# Patient Record
Sex: MALE | Race: WHITE | NOT HISPANIC OR LATINO | Employment: OTHER | URBAN - METROPOLITAN AREA
[De-identification: names, ages, dates, MRNs, and addresses within clinical notes are randomized per-mention and may not be internally consistent; named-entity substitution may affect disease eponyms.]

---

## 2017-03-21 ENCOUNTER — PREPPED CHART (OUTPATIENT)
Dept: URBAN - METROPOLITAN AREA CLINIC 43 | Facility: CLINIC | Age: 82
End: 2017-03-21

## 2018-04-19 ENCOUNTER — ESTABLISHED COMPREHENSIVE EXAM (OUTPATIENT)
Dept: URBAN - METROPOLITAN AREA CLINIC 43 | Facility: CLINIC | Age: 83
End: 2018-04-19

## 2018-04-19 DIAGNOSIS — H35.373: ICD-10-CM

## 2018-04-19 DIAGNOSIS — Z96.1: ICD-10-CM

## 2018-04-19 DIAGNOSIS — H04.123: ICD-10-CM

## 2018-04-19 DIAGNOSIS — H43.813: ICD-10-CM

## 2018-04-19 PROCEDURE — 92015 DETERMINE REFRACTIVE STATE: CPT

## 2018-04-19 PROCEDURE — 92014 COMPRE OPH EXAM EST PT 1/>: CPT

## 2018-04-19 ASSESSMENT — VISUAL ACUITY
OS_SC: J2
OS_SC: 20/30+2
OD_SC: 20/40-2
OU_SC: J1+
OD_SC: J2
OU_SC: 20/25-2

## 2018-04-19 ASSESSMENT — TONOMETRY
OD_IOP_MMHG: 16
OS_IOP_MMHG: 17

## 2019-01-30 NOTE — PATIENT DISCUSSION
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 Cyclogyl with surgery.

## 2019-01-30 NOTE — PATIENT DISCUSSION
The types of intraocular lenses were reviewed with the patient along with a discussion of their various strengths and weaknesses.  The patient qualifies for all IOL options.  The patient elects Custom IOL goal emmetropia.  The patient accepts and understands the need for spectacles at near and intermediate.

## 2019-02-25 NOTE — PATIENT DISCUSSION
Cataract surgery has been performed in the first eye and activities of daily living are still impaired. The patient would like to proceed with cataract surgery in the second eye as scheduled. The patient elects TMF +3.25 OS, goal of emmetropia.

## 2019-07-11 NOTE — PATIENT DISCUSSION
Monitor. [FreeTextEntry1] : DM f/u - not seen in 4.5 months\par POOR COMPLIANCE WITH F/U NOTED\par Now reports statin gave her muscle aches so stopped it long ago [de-identified] : Diabetes:\par The patient has been diagnosed with diabetes since 2018.  The frequency of monitoring is NA.  Hypoglycemic episodes are not known.  The results of the last hemoglobin A1c's were 6.3 Mar 2019;  7.5 Oct 2018.  Side effects of the medications include none. Compliance with the medical regimen has been good.  \par \par Hypercholesterolemia: Last  Mar\par Since the last visit, the patient has no complaints. Major cardiovascular risk factors include hyperlipidemia and diabetes. The LDL goal for this patient is < 100 mg/dl.  Dietary modifications have included a low fat diet.  Exercise modifications have included walking. Responses to the medications have been fair BUT STOPPED MED. The patient's adherence to the medication treatment is POOR. The side effects of the medication include none; the patient specifically STATES SHE GETS MYALGIAS. The patient's weight has CHANGED BY +3.

## 2023-09-26 NOTE — PATIENT DISCUSSION
+LensAR. IA only. small pupil.
Cataract surgery has been performed in the first eye and activities of daily living are still impaired. The patient would like to proceed with cataract surgery in the second eye as scheduled. The patient elects TMF +3.25 OS, goal of emmetropia.
Good postoperative appearance.
Monitor.
Patient made aware of 24/7 emergency services.
Patient understands condition, prognosis and need for follow up care.
Patient understands there is an increased risk of corneal edema after cataract surgery.
Recommended artificial tears to use: 1 drop 4x a day in both eyes.
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 Cyclogyl with surgery.
Yes
cyclogyl.
follow with Dr. Breezy Will.
[Patient/caregiver able to verbalize understanding of medications, including indications and side effects] : Patient/caregiver able to verbalize understanding of medications, including indications and side effects
[Patient/caregiver able to verbalize understanding of medications, including indications and side effects] : Patient/caregiver able to verbalize understanding of medications, including indications and side effects